# Patient Record
Sex: MALE | Race: WHITE | NOT HISPANIC OR LATINO | Employment: FULL TIME | URBAN - METROPOLITAN AREA
[De-identification: names, ages, dates, MRNs, and addresses within clinical notes are randomized per-mention and may not be internally consistent; named-entity substitution may affect disease eponyms.]

---

## 2024-01-29 ENCOUNTER — APPOINTMENT (OUTPATIENT)
Dept: ORTHOPEDIC SURGERY | Facility: CLINIC | Age: 44
End: 2024-01-29
Payer: COMMERCIAL

## 2025-02-12 ENCOUNTER — OFFICE VISIT (OUTPATIENT)
Dept: URGENT CARE | Age: 45
End: 2025-02-12
Payer: COMMERCIAL

## 2025-02-12 VITALS
DIASTOLIC BLOOD PRESSURE: 120 MMHG | BODY MASS INDEX: 44.1 KG/M2 | TEMPERATURE: 99.3 F | RESPIRATION RATE: 16 BRPM | OXYGEN SATURATION: 97 % | HEART RATE: 129 BPM | SYSTOLIC BLOOD PRESSURE: 182 MMHG | WEIGHT: 315 LBS | HEIGHT: 71 IN

## 2025-02-12 DIAGNOSIS — J10.1 INFLUENZA A: Primary | ICD-10-CM

## 2025-02-12 LAB
POC BINAX EXPIRATION: 0
POC BINAX NOW COVID SERIAL NUMBER: 0
POC RAPID INFLUENZA A: POSITIVE
POC RAPID INFLUENZA B: NEGATIVE
POC SARS-COV-2 AG BINAX: NORMAL

## 2025-02-12 RX ORDER — OSELTAMIVIR PHOSPHATE 75 MG/1
75 CAPSULE ORAL EVERY 12 HOURS
Qty: 10 CAPSULE | Refills: 0 | Status: SHIPPED | OUTPATIENT
Start: 2025-02-12 | End: 2025-02-17

## 2025-02-12 RX ORDER — CLONAZEPAM 0.5 MG/1
TABLET ORAL
COMMUNITY

## 2025-02-13 NOTE — PATIENT INSTRUCTIONS
You have influenza, also known as the flu.   Symptoms may last for up to 1-2 weeks, but follow up with PCP if your symptoms start worsening.  For muscle aches, fever, chills: Acetaminophen or Ibuprofen, Advil, or Aleve can be used.  Hydration: Maintain adequate hydration with water.  Nasal symptoms: Nasal Saline available over-the-counter, can be used 3-4 times per day  Decongestants reduce nasal congestion and discharge  Vaseline at opening of nares may reduce irritation   Cool Mist Humidifier may loosens discharge  Cough Suppressants or expectorants may be taken over-the-counter    Tamiflu, and anti-viral medication to help decrease the severity and duration of flu symptoms will be prescribed, use as directed   Hand hygiene is important, please wash your hands before and after coming in contact with other people.    Influenza can make you more sick and lead to secondary infections, if you start having new or worsening symptoms such as persistent fevers, trouble breathing, follow-up sooner or go to the emergency room.

## 2025-02-13 NOTE — PROGRESS NOTES
"Subjective   Patient ID: Cesia Munoz is a 45 y.o. male. They present today with a chief complaint of Fever (X a few days, sweating. Took motrin at 4:00pm), Nasal Congestion, and Cough.    Patient disposition: Home    History of Present Illness  HPI  2 days of sweats, runny nose, cough.  Sick contacts at work.  Has been taking ibuprofen with improvement of fever, sweating.  No history of asthma or bronchitis.  No chest congestion.  Cough is deep, productive.  COVID test was negative at home.  No GI symptoms.  No other complaints.      Past Medical History  Allergies as of 02/12/2025 - Reviewed 02/12/2025   Allergen Reaction Noted    Azithromycin Hives 01/29/2020    Codeine Hives 01/29/2020    Opioids - morphine analogues Unknown 04/16/2002       (Not in a hospital admission)       Current Outpatient Medications   Medication Sig Dispense Refill    clonazePAM (KlonoPIN) 0.5 mg tablet take one tablet by mouth every EIGHT hours as needed for anxiety MDD 3 (THREE)       No current facility-administered medications for this visit.       There is no problem list on file for this patient.      No past surgical history on file.     reports that he has never smoked. He has never used smokeless tobacco.    Review of Systems  As noted in HPI. ROS otherwise negative unless noted.       Objective    Vitals:    02/12/25 1856   Pulse: (!) 129   Resp: 16   Temp: 37.4 °C (99.3 °F)   TempSrc: Oral   SpO2: 97%   Weight: (!) 163 kg (360 lb)   Height: 1.803 m (5' 11\")     No LMP for male patient.    Physical Exam  Constitutional: vital signs reviewed. Well developed, well nourished. patient alert and patient without distress.   Head and Face: Normal and atraumatic.    Ears, Nose, Mouth, and Throat:   Hearing: Normal.  External inspection of nose: Normal.   Lips, teeth, tongue and gums: Normal and well hydrated. External inspection of ears: Normal. Posterior pharynx moist, no exudate, symmetric, no abscess, and with post nasal drip. "   Neck: No neck mass was observed. Supple. normal muscle tone.   Cardiovascular: Heart rate normal, normal S1 and S2, no gallops, no murmurs and no pericardial rub. Rhythm: Normal.  Pulmonary: No respiratory distress. Palpation of chest: Normal. Clear bilateral breath sounds.   Lymphatic: No cervical lymphadenopathy  Psych: Normal mood and affect        Procedures    Point of Care Test & Imaging Results from this visit  No results found for this or any previous visit.       Repeat BP:  Diagnostic study results (if any) were reviewed.    Assessment/Plan   Allergies, medications, history, and pertinent labs/EKGs/Imaging reviewed.    Medical Decision Making  See note    Orders and Diagnoses  Diagnoses and all orders for this visit:  Flu-like symptoms  -     POCT Covid-19 Rapid Antigen  -     POCT Influenza A/B manually resulted      Medical Admin Record      Follow Up Instructions  No follow-ups on file.    [At time of discharge patient was clinically well-appearing and HDS for outpatient management. The patient and/or family was educated regarding diagnosis, supportive care, OTC and Rx medications. The patient and/or family was given the opportunity to ask questions prior to discharge and all questions answered. They verbalized understanding of my discussion of the plans for treatment, expected course, indications to return to  or seek further evaluation in ED, and the need for timely follow up as directed. ]      Electronically signed by [facility]